# Patient Record
Sex: FEMALE | Race: WHITE | Employment: FULL TIME | URBAN - METROPOLITAN AREA
[De-identification: names, ages, dates, MRNs, and addresses within clinical notes are randomized per-mention and may not be internally consistent; named-entity substitution may affect disease eponyms.]

---

## 2017-01-18 ENCOUNTER — OFFICE VISIT (OUTPATIENT)
Dept: FAMILY MEDICINE | Facility: CLINIC | Age: 27
End: 2017-01-18
Payer: COMMERCIAL

## 2017-01-18 VITALS
DIASTOLIC BLOOD PRESSURE: 70 MMHG | WEIGHT: 119.6 LBS | TEMPERATURE: 98.8 F | HEART RATE: 75 BPM | SYSTOLIC BLOOD PRESSURE: 128 MMHG | BODY MASS INDEX: 20.42 KG/M2 | RESPIRATION RATE: 16 BRPM | OXYGEN SATURATION: 99 % | HEIGHT: 64 IN

## 2017-01-18 DIAGNOSIS — Z12.4 SCREENING FOR MALIGNANT NEOPLASM OF CERVIX: ICD-10-CM

## 2017-01-18 DIAGNOSIS — Z30.017 INSERTION OF NEXPLANON: Primary | ICD-10-CM

## 2017-01-18 DIAGNOSIS — F41.1 GAD (GENERALIZED ANXIETY DISORDER): ICD-10-CM

## 2017-01-18 DIAGNOSIS — Z11.3 SCREEN FOR STD (SEXUALLY TRANSMITTED DISEASE): ICD-10-CM

## 2017-01-18 LAB — BETA HCG QUAL IFA URINE: NEGATIVE

## 2017-01-18 PROCEDURE — 84703 CHORIONIC GONADOTROPIN ASSAY: CPT | Performed by: NURSE PRACTITIONER

## 2017-01-18 PROCEDURE — 99213 OFFICE O/P EST LOW 20 MIN: CPT | Mod: 25 | Performed by: NURSE PRACTITIONER

## 2017-01-18 PROCEDURE — 11981 INSERTION DRUG DLVR IMPLANT: CPT | Performed by: NURSE PRACTITIONER

## 2017-01-18 PROCEDURE — G0145 SCR C/V CYTO,THINLAYER,RESCR: HCPCS | Performed by: NURSE PRACTITIONER

## 2017-01-18 PROCEDURE — 87491 CHLMYD TRACH DNA AMP PROBE: CPT | Performed by: NURSE PRACTITIONER

## 2017-01-18 PROCEDURE — 87591 N.GONORRHOEAE DNA AMP PROB: CPT | Performed by: NURSE PRACTITIONER

## 2017-01-18 RX ORDER — FLUOXETINE 10 MG/1
10 CAPSULE ORAL DAILY
Qty: 90 CAPSULE | Refills: 1 | Status: SHIPPED | OUTPATIENT
Start: 2017-01-18 | End: 2018-11-29

## 2017-01-18 RX ORDER — FLUOXETINE 10 MG/1
10 CAPSULE ORAL DAILY
Qty: 30 CAPSULE | Refills: 1 | Status: SHIPPED | OUTPATIENT
Start: 2017-01-18 | End: 2017-01-18

## 2017-01-18 NOTE — PATIENT INSTRUCTIONS
Watch for any signs of infection.  Redness, swelling, increased pain, fever.  Follow up if these occur.  Keep the outer larger bandage on for 1 day, the band aid on for 3-5 days.  Call with concerns.

## 2017-01-18 NOTE — MR AVS SNAPSHOT
After Visit Summary   1/18/2017    Teri Bauman    MRN: [id number]           Patient Information     Date Of Birth          1990        Visit Information        Provider Department      1/18/2017 1:40 PM Desiree Mak Ra, APRN CNP [facility]        Today's Diagnoses     Insertion of Nexplanon    -  1     SARA (generalized anxiety disorder)         Screen for STD (sexually transmitted disease)         Screening for malignant neoplasm of cervix           Care Instructions    Watch for any signs of infection.  Redness, swelling, increased pain, fever.  Follow up if these occur.  Keep the outer larger bandage on for 1 day, the band aid on for 3-5 days.  Call with concerns.        Follow-ups after your visit        Who to contact     If you have questions or need follow up information about today's clinic visit or your schedule please contact [facility] directly at [phone].  Normal or non-critical lab and imaging results will be communicated to you by MyChart, letter or phone within 4 business days after the clinic has received the results. If you do not hear from us within 7 days, please contact the clinic through Clow[employer] or phone. If you have a critical or abnormal lab result, we will notify you by phone as soon as possible.  Submit refill requests through [employer] or call your pharmacy and they will forward the refill request to us. Please allow 3 business days for your refill to be completed.          Additional Information About Your Visit        MyChart Information     [employer] gives you secure access to your electronic health record. If you see a primary care provider, you can also send messages to your care team and make appointments. If you have questions, please call your primary care clinic.  If you do not have a primary care provider, please call [phone] and they will assist you.        Care EveryWhere ID     This is your Care EveryWhere ID.  "This could be used by other organizations to access your [facility] medical records  [id number]        Your Vitals Were     Pulse Temperature Respirations Height BMI (Body Mass Index) Pulse Oximetry    75 98.8  F (37.1  C) (Oral) 16 5' 4\" (1.626 m) 20.52 kg/m2 99%       Blood Pressure from Last 3 Encounters:   01/18/17 128/70   10/24/16 112/54   05/28/15 110/70    Weight from Last 3 Encounters:   01/18/17 119 lb 9.6 oz (54.25 kg)   10/24/16 116 lb 8 oz (52.844 kg)   05/28/15 114 lb 4.8 oz (51.846 kg)              We Performed the Following     Beta HCG qual IFA urine     Chlamydia trachomatis PCR     Neisseria gonorrhoeae PCR     Pap imaged thin layer screen reflex to HPV if ASCUS - recommend age 25 - 29          Where to get your medicines      These medications were sent to [id number] [address] [address]     Phone:  [phone]    - FLUoxetine 10 MG capsule       Primary Care Provider Office Phone # Fax #    Desiree GUY [facility] [phone]       [facility] [address] [address]  [facility] [address]        Thank you!     Thank you for choosing [facility]  for your care. Our goal is always to provide you with excellent care. Hearing back from our patients is one way we can continue to improve our services. Please take a few minutes to complete the written survey that you may receive in the mail after your visit with us. Thank you!             Your Updated Medication List - Protect others around you: Learn how to safely use, store and throw away your medicines at www.disposemymeds.org.          This list is accurate as of: 1/18/17  2:26 PM.  Always use your most recent med list.                   Brand Name Dispense Instructions for use    FLUoxetine 10 MG capsule    PROzac    30 capsule    Take 1 capsule (10 mg) by mouth daily       norethindrone-ethinyl estradiol 1-20 MG-MCG per tablet    MICROGESTIN " 1/20    3 Package    Take 1 tablet by mouth daily

## 2017-01-18 NOTE — PROGRESS NOTES
"  SUBJECTIVE:                                                    Teri Bauman is a 26 year old female who presents to clinic today for the following health issues:    Pt is here for a nexplanon insert and pap smear.      Pt returns for insertion of nexplanon.  Needs pap as well.    No changes since last seen.  No htn, blood clots, migraines, smoking.  Sexually active, male partner.  Monogamous.  Has been using condoms.  Just finished period.  No liver or kidney issues.    Problem list and histories reviewed & adjusted, as indicated.  Additional history: as documented    Patient Active Problem List   Diagnosis     CARDIOVASCULAR SCREENING; LDL GOAL LESS THAN 160     SARA (generalized anxiety disorder)     Papanicolaou smear of cervix with low grade squamous intraepithelial lesion (LGSIL) (aka LSIL)     History reviewed. No pertinent past surgical history.    Social History   Substance Use Topics     Smoking status: Never Smoker      Smokeless tobacco: Never Used     Alcohol Use: 0.0 oz/week     0 Standard drinks or equivalent per week      Comment: Occ     Family History   Problem Relation Age of Onset     Multiple Sclerosis Maternal Grandmother      Family History Negative Mother      Family History Negative Father      Family History Negative Maternal Grandfather      Family History Negative Paternal Grandfather      Family History Negative Paternal Grandmother          Problem list, Medication list, Allergies, and Medical/Social/Surgical histories reviewed in EPIC and updated as appropriate.    ROS:  SEE HPI.    OBJECTIVE:                                                    /70 mmHg  Pulse 75  Temp(Src) 98.8  F (37.1  C) (Oral)  Resp 16  Ht 5' 4\" (1.626 m)  Wt 119 lb 9.6 oz (54.25 kg)  BMI 20.52 kg/m2  SpO2 99%  Body mass index is 20.52 kg/(m^2).  GENERAL: healthy, alert and no distress   (female): normal female external genitalia, normal urethral meatus , vaginal mucosa pink, moist, well rugated " and normal cervix, adnexae, and uterus without masses.  PSYCH: mentation appears normal, affect normal/bright    Diagnostic Test Results:  Results for orders placed or performed in visit on 01/18/17 (from the past 24 hour(s))   Beta HCG qual IFA urine   Result Value Ref Range    Beta HCG Qual IFA Urine Negative NEG        ASSESSMENT/PLAN:                                                    1. Insertion of Nexplanon  Tolerated well, see below.  - Beta HCG qual IFA urine    2. SARA (generalized anxiety disorder)  Initially took for 1 month, thought it helped but did not know how to refill.  No SEs.  Would like to restart.  Refilled.    3. Screen for STD (sexually transmitted disease)  - Chlamydia trachomatis PCR  - Neisseria gonorrhoeae PCR    4. Screening for malignant neoplasm of cervix  Pap today.  - Pap imaged thin layer screen reflex to HPV if ASCUS - recommend age 25 - 29    NEXPLANON PLACEMENT:    The patient meets and is agreeable to the following conditions:  She is not interested in conception in the near future.  There is no previous history of pelvic inflammatory disease.  There is no previous history of ectopic pregnancy.  There is no history of unresolved abnormal uterine bleeding.  There is no history of an unresolved abnormal PAP smear.  She has no history of diabetes, AIDS, leukemia, IV drug use or chronic steroid use.  She denies the possibility of pregnancy. Patient's last menstrual period was 1/11/17.  Pregnancy test today is negative.    The patient was given patient information on the Nexplanon and the patient education brochure from the . The patient has given consent to proceed with placement of the Nexplanon.  A written consent form is present in the chart.  She wishes to proceed.    PROCEDURE:  Type of Device: Nexplanon  The patient lied in supine with the full length of her arm exposed and supported by the pillow and table.   The positioning the upper inner aspect of her  nondominant arm was bent at her elbow to 90 degrees and rotated upward and outward opposite her head.  The insertion site was Identified approximately four finger  breadths/ 8cm superior and lateral to the medial epicondyle of the humerus.  Skin was ana the skin to help guide insertion. One ana is made where the rylie will be inserted and a second ana is made four centimeters proximal to the first ana to serve as a direction guide during insertion.  The insertion site was cleansed with an antiseptic solution.  Anesthesia using Lidocaine 1% was injected into the dermis to raise a wheal along the planned track of the rylie insertion needle.   The clear plastic needle cover was removed. The needle was placed against the insertion site holding the applicator at an angle 30 degrees to the skin. While applying counter traction to the skin around the insertion site, the skin was punctured with the needle tip. The applicator was the lowered so that it is parallel to the skin and then the needle was advanced in the subdermal connective tissue while lifting the skin with the tip of the needle. The needle was advanced to its full length under the skin. I unlocked the slider with downward finger pressure on the lever, then moved the slider fully backward (distally) and removed the applicator.  Immediately after insertion, I palpated the skin to verify correct placement of the rylie; both ends were palpable. Patient was also asked to feel her implant.  An adhesive closure was placed on the insertion puncture and the site was wrapped with a pressure bandage.   Patient's User Card was completed and given to patient.     The patient tolerated this procedure without immediate complication.  The patient is to return or call immediately for any unexplained fever, redness, pain and swelling at the insertion site. Recommended to take Tylenol or NSAID for mild to moderate pain.    Patient was instructed may remove the pressure bandage in  24 hours and the small bandage over the insertion site after 3-5 days.   A completed the User Card was given to the patient to keep.     Information on Nexplanon was given to patient. She was instructed to watch for signs of infection such as redness, swelling, discharge, watch for increased bleeding, worsening pain and fever and to RTC ASAP. Questions and concerns were addressed.    GUY Agudelo Ra, CNP  [facility]

## 2017-01-18 NOTE — NURSING NOTE
"Chief Complaint   Patient presents with     Procedure     Nexplanon insert      Gyn Exam     pap smear        Initial /70 mmHg  Pulse 75  Temp(Src) 98.8  F (37.1  C) (Oral)  Resp 16  Ht 5' 4\" (1.626 m)  Wt 119 lb 9.6 oz (54.25 kg)  BMI 20.52 kg/m2  SpO2 99% Estimated body mass index is 20.52 kg/(m^2) as calculated from the following:    Height as of this encounter: 5' 4\" (1.626 m).    Weight as of this encounter: 119 lb 9.6 oz (54.25 kg).  BP completed using cuff size: bennett Caba MA      "

## 2017-01-19 LAB
C TRACH DNA SPEC QL NAA+PROBE: NORMAL
N GONORRHOEA DNA SPEC QL NAA+PROBE: NORMAL
SPECIMEN SOURCE: NORMAL
SPECIMEN SOURCE: NORMAL

## 2017-01-20 LAB
COPATH REPORT: NORMAL
PAP: NORMAL

## 2018-02-24 ENCOUNTER — HEALTH MAINTENANCE LETTER (OUTPATIENT)
Age: 28
End: 2018-02-24

## 2018-09-28 ENCOUNTER — TELEPHONE (OUTPATIENT)
Dept: FAMILY MEDICINE | Facility: CLINIC | Age: 28
End: 2018-09-28

## 2018-10-02 NOTE — TELEPHONE ENCOUNTER
Pt is past due for physical, f/u pap smear/HPV test.  1 reminder letter sent previously.  Left msg today for patient to call clinic to schedule.    If no reply and/or appt within two weeks (10/16/18) patient will be considered lost to pap tracking f/u.    BERE HajiN, RN, Pap Tracking Nurse

## 2018-11-29 ENCOUNTER — OFFICE VISIT (OUTPATIENT)
Dept: FAMILY MEDICINE | Facility: CLINIC | Age: 28
End: 2018-11-29
Payer: COMMERCIAL

## 2018-11-29 VITALS
OXYGEN SATURATION: 100 % | SYSTOLIC BLOOD PRESSURE: 108 MMHG | BODY MASS INDEX: 21.92 KG/M2 | HEIGHT: 64 IN | WEIGHT: 128.4 LBS | HEART RATE: 64 BPM | TEMPERATURE: 98.7 F | RESPIRATION RATE: 14 BRPM | DIASTOLIC BLOOD PRESSURE: 66 MMHG

## 2018-11-29 DIAGNOSIS — Z23 NEED FOR VACCINATION: ICD-10-CM

## 2018-11-29 DIAGNOSIS — R87.612 PAPANICOLAOU SMEAR OF CERVIX WITH LOW GRADE SQUAMOUS INTRAEPITHELIAL LESION (LGSIL): Primary | ICD-10-CM

## 2018-11-29 DIAGNOSIS — H61.21 IMPACTED CERUMEN OF RIGHT EAR: ICD-10-CM

## 2018-11-29 DIAGNOSIS — N63.0 LUMP OR MASS IN BREAST: ICD-10-CM

## 2018-11-29 PROCEDURE — 88175 CYTOPATH C/V AUTO FLUID REDO: CPT | Performed by: NURSE PRACTITIONER

## 2018-11-29 PROCEDURE — 90471 IMMUNIZATION ADMIN: CPT | Performed by: NURSE PRACTITIONER

## 2018-11-29 PROCEDURE — 99213 OFFICE O/P EST LOW 20 MIN: CPT | Mod: 25 | Performed by: NURSE PRACTITIONER

## 2018-11-29 PROCEDURE — 69209 REMOVE IMPACTED EAR WAX UNI: CPT | Mod: RT | Performed by: NURSE PRACTITIONER

## 2018-11-29 PROCEDURE — 87624 HPV HI-RISK TYP POOLED RSLT: CPT | Performed by: NURSE PRACTITIONER

## 2018-11-29 PROCEDURE — 90715 TDAP VACCINE 7 YRS/> IM: CPT | Performed by: NURSE PRACTITIONER

## 2018-11-29 NOTE — PROGRESS NOTES
SUBJECTIVE:   Teri Bauman is a 28 year old female who presents to clinic today for the following health issues:      Ear problem      Duration: 2 weeks    Description (location/character/radiation): trouble hear out of right ear    Intensity:  mild    Accompanying signs and symptoms: muffled sound    History (similar episodes/previous evaluation): None    Precipitating or alleviating factors: None    Therapies tried and outcome: None   Pt was itching her ear and pushed wax in.  Now with muffled hearing.  No pain.    Patient would like to get a pap.  Previous LSIL pap.  Needs diagnostic.    She also notes a breast lump R breast, no pain.  She had a previous surgery for a cyst when she was in high school.    Problem list and histories reviewed & adjusted, as indicated.  Additional history: as documented    Patient Active Problem List   Diagnosis     CARDIOVASCULAR SCREENING; LDL GOAL LESS THAN 160     SARA (generalized anxiety disorder)     Papanicolaou smear of cervix with low grade squamous intraepithelial lesion (LGSIL) (aka LSIL)     History reviewed. No pertinent surgical history.    Social History   Substance Use Topics     Smoking status: Never Smoker     Smokeless tobacco: Never Used     Alcohol use 0.0 oz/week     0 Standard drinks or equivalent per week      Comment: Occ     Family History   Problem Relation Age of Onset     Multiple Sclerosis Maternal Grandmother      Family History Negative Mother      Family History Negative Father      Family History Negative Maternal Grandfather      Family History Negative Paternal Grandfather      Family History Negative Paternal Grandmother            Reviewed and updated as needed this visit by clinical staff       Reviewed and updated as needed this visit by Provider         ROS:  SEE HPI.    OBJECTIVE:     /66 (BP Location: Right arm, Patient Position: Chair, Cuff Size: Adult Regular)  Pulse 64  Temp 98.7  F (37.1  C) (Tympanic)  Resp 14  Ht 5'  "3.5\" (1.613 m)  Wt 128 lb 6.4 oz (58.2 kg)  SpO2 100%  BMI 22.39 kg/m2  Body mass index is 22.39 kg/(m^2).  GENERAL: healthy, alert and no distress  EYES: Eyes grossly normal to inspection  HENT: normal cephalic/atraumatic, right ear: occluded with wax and left ear: normal: no effusions, no erythema, normal landmarks  BREAST: R breast 9 o'clock position palpable nontender lump.   (female): normal female external genitalia, normal urethral meatus , vaginal mucosa pink, moist, well rugated and normal cervix  PSYCH: mentation appears normal, affect normal/bright    Diagnostic Test Results:  none     ASSESSMENT/PLAN:   1. Papanicolaou smear of cervix with low grade squamous intraepithelial lesion (LGSIL) (aka LSIL)  Pap today.  - Pap imaged thin layer diagnostic with HPV (select HPV order below)    2. Lump or mass in breast  US ordered.  - US Breast Right Complete 4 Quadrants; Future    3. Impacted cerumen of right ear  Irrigated successfully in clinic.    4. Need for vaccination  - SCREENING QUESTIONS FOR ADULT IMMUNIZATIONS  - TDAP, IM (10 - 64 YRS) - Adacel  - ADMIN 1st VACCINE    GUY Agudelo Ra, CNP  [facility]  "

## 2018-11-29 NOTE — MR AVS SNAPSHOT
After Visit Summary   11/29/2018    Teri Bauman    MRN: [id number]           Patient Information     Date Of Birth          1990        Visit Information        Provider Department      11/29/2018 3:40 PM Desiree Mak Ra, APRN CNP [facility]        Today's Diagnoses     Need for vaccination    -  1    Papanicolaou smear of cervix with low grade squamous intraepithelial lesion (LGSIL) (aka LSIL)        Lump or mass in breast        Impacted cerumen of right ear          Care Instructions    We will talk more after the ultrasound is done.          Follow-ups after your visit        Follow-up notes from your care team     Return in about 1 year (around 11/29/2019) for Physical Exam.      Future tests that were ordered for you today     Open Future Orders        Priority Expected Expires Ordered    US Breast Right Complete 4 Quadrants Routine  11/29/2019 11/29/2018            Who to contact     If you have questions or need follow up information about today's clinic visit or your schedule please contact [facility] directly at [phone].  Normal or non-critical lab and imaging results will be communicated to you by [employer], letter or phone within 4 business days after the clinic has received the results. If you do not hear from us within 7 days, please contact the clinic through [employer] or phone. If you have a critical or abnormal lab result, we will notify you by phone as soon as possible.  Submit refill requests through [employer] or call your pharmacy and they will forward the refill request to us. Please allow 3 business days for your refill to be completed.          Additional Information About Your Visit        [employer] Information     [employer] gives you secure access to your electronic health record. If you see a primary care provider, you can also send messages to your care team and make appointments. If you have questions, please call your primary care  "clinic.  If you do not have a primary care provider, please call [phone] and they will assist you.        Care EveryWhere ID     This is your Care EveryWhere ID. This could be used by other organizations to access your [facility] medical records  [id number]        Your Vitals Were     Pulse Temperature Respirations Height Pulse Oximetry BMI (Body Mass Index)    64 98.7  F (37.1  C) (Tympanic) 14 5' 3.5\" (1.613 m) 100% 22.39 kg/m2       Blood Pressure from Last 3 Encounters:   11/29/18 108/66   01/18/17 128/70   10/24/16 112/54    Weight from Last 3 Encounters:   11/29/18 128 lb 6.4 oz (58.2 kg)   01/18/17 119 lb 9.6 oz (54.3 kg)   10/24/16 116 lb 8 oz (52.8 kg)              We Performed the Following     ADMIN 1st VACCINE     Pap imaged thin layer diagnostic with HPV (select HPV order below)     SCREENING QUESTIONS FOR ADULT IMMUNIZATIONS     TDAP, IM (10 - 64 YRS) - Adacel        Primary Care Provider Office Phone # Fax #    Desiree GUY [facility] [phone]       [address] [facility] [address]        Equal Access to Services     BALJINDER SANCHEZ AH: Hadii aad ku hadasho Soomaali, waaxda luqadaha, qaybta kaalmada adeegyada, waxay idiin haymansin nash villa . So [facility] [phone].    ATENCIÓN: Si habla español, tiene a alanis disposición servicios gratuitos de asistencia lingüística. Lljoel al [phone].    We comply with applicable federal civil rights laws and [place] laws. We do not discriminate on the basis of race, color, national origin, age, disability, sex, sexual orientation, or gender identity.            Thank you!     Thank you for choosing [facility]  for your care. Our goal is always to provide you with excellent care. Hearing back from our patients is one way we can continue to improve our services. Please take a few minutes to complete the written survey that you may receive in the mail after your visit with us. Thank you!             Your Updated " Medication List - Protect others around you: Learn how to safely use, store and throw away your medicines at www.disposemymeds.org.          This list is accurate as of 11/29/18  4:21 PM.  Always use your most recent med list.                   Brand Name Dispense Instructions for use Diagnosis    FLUoxetine 10 MG capsule    PROzac    90 capsule    Take 1 capsule (10 mg) by mouth daily    SARA (generalized anxiety disorder)       NEXPLANON 68 MG Impl   Generic drug:  etonogestrel      1 each by Subdermal route once        norethindrone-ethinyl estradiol 1-20 MG-MCG tablet    MICROGESTIN 1/20    3 Package    Take 1 tablet by mouth daily    Routine general medical examination at a health care facility, Contraception

## 2018-12-04 LAB
COPATH REPORT: NORMAL
PAP: NORMAL

## 2018-12-05 LAB
FINAL DIAGNOSIS: NORMAL
HPV HR 12 DNA CVX QL NAA+PROBE: NEGATIVE
HPV16 DNA SPEC QL NAA+PROBE: NEGATIVE
HPV18 DNA SPEC QL NAA+PROBE: NEGATIVE
SPECIMEN DESCRIPTION: NORMAL
SPECIMEN SOURCE CVX/VAG CYTO: NORMAL

## 2018-12-21 ENCOUNTER — HOSPITAL ENCOUNTER (OUTPATIENT)
Dept: ULTRASOUND IMAGING | Facility: CLINIC | Age: 28
Discharge: HOME | End: 2018-12-21
Attending: NURSE PRACTITIONER | Admitting: NURSE PRACTITIONER
Payer: COMMERCIAL

## 2018-12-21 DIAGNOSIS — N63.0 LUMP OR MASS IN BREAST: ICD-10-CM

## 2018-12-21 PROCEDURE — 76642 ULTRASOUND BREAST LIMITED: CPT | Mod: RT

## 2020-02-10 ENCOUNTER — HEALTH MAINTENANCE LETTER (OUTPATIENT)
Age: 30
End: 2020-02-10

## 2020-11-16 ENCOUNTER — HEALTH MAINTENANCE LETTER (OUTPATIENT)
Age: 30
End: 2020-11-16

## 2021-01-27 ENCOUNTER — OFFICE VISIT (OUTPATIENT)
Dept: FAMILY MEDICINE | Facility: CLINIC | Age: 31
End: 2021-01-27
Payer: COMMERCIAL

## 2021-01-27 VITALS
DIASTOLIC BLOOD PRESSURE: 64 MMHG | BODY MASS INDEX: 21.68 KG/M2 | HEIGHT: 64 IN | HEART RATE: 56 BPM | RESPIRATION RATE: 16 BRPM | WEIGHT: 127 LBS | SYSTOLIC BLOOD PRESSURE: 108 MMHG | TEMPERATURE: 98.6 F

## 2021-01-27 DIAGNOSIS — Z30.46 NEXPLANON REMOVAL: Primary | ICD-10-CM

## 2021-01-27 DIAGNOSIS — Z30.011 ENCOUNTER FOR INITIAL PRESCRIPTION OF CONTRACEPTIVE PILLS: ICD-10-CM

## 2021-01-27 PROCEDURE — 99213 OFFICE O/P EST LOW 20 MIN: CPT | Mod: 25 | Performed by: NURSE PRACTITIONER

## 2021-01-27 PROCEDURE — 11982 REMOVE DRUG IMPLANT DEVICE: CPT | Performed by: NURSE PRACTITIONER

## 2021-01-27 RX ORDER — NORETHINDRONE ACETATE AND ETHINYL ESTRADIOL .02; 1 MG/1; MG/1
1 TABLET ORAL DAILY
Qty: 84 TABLET | Refills: 3 | Status: SHIPPED | OUTPATIENT
Start: 2021-01-27 | End: 2021-10-21

## 2021-01-27 ASSESSMENT — MIFFLIN-ST. JEOR: SCORE: 1286.07

## 2021-01-27 NOTE — PROGRESS NOTES
"  Assessment & Plan     Nexplanon removal  Tolerated well.  See below.  - REMOVAL IMPLATABLE CONTRACEPTIVE CAPSULE    Encounter for initial prescription of contraceptive pills  No known contraindications.    - norethindrone-ethinyl estradiol (MICROGESTIN 1/20) 1-20 MG-MCG tablet; Take 1 tablet by mouth daily      Return in about 10 months (around 11/27/2021) for Physical Exam.    Desiree Mak, GUY CNP  [facility] Williamson is a 30 year old who presents to clinic today for the following health issues     HPI       Patient is here to have her Nexplanon removed and discuss other birth control options.  Has done well with this.  She is interested in taking the pill.  Works regular hours now and believes compliance will not be an issue.  No hx of blood clots, smoking, migraines, htn.  She would like something that could possibly help with some excess hair growth she has.  She has had some irregular bleeding over the past year with nexplanon in place.    Review of Systems   Constitutional, HEENT, cardiovascular, pulmonary, gi and gu systems are negative, except as otherwise noted.      Objective    /64 (BP Location: Right arm, Patient Position: Sitting, Cuff Size: Adult Regular)   Pulse 56   Temp 98.6  F (37  C) (Oral)   Resp 16   Ht 1.626 m (5' 4\")   Wt 57.6 kg (127 lb)   BMI 21.80 kg/m    Body mass index is 21.8 kg/m .  Physical Exam   GENERAL: healthy, alert and no distress  PSYCH: mentation appears normal, affect normal/bright          "

## 2021-04-03 ENCOUNTER — HEALTH MAINTENANCE LETTER (OUTPATIENT)
Age: 31
End: 2021-04-03

## 2021-09-12 ENCOUNTER — HEALTH MAINTENANCE LETTER (OUTPATIENT)
Age: 31
End: 2021-09-12

## 2021-10-21 ENCOUNTER — OFFICE VISIT (OUTPATIENT)
Dept: FAMILY MEDICINE | Facility: CLINIC | Age: 31
End: 2021-10-21
Payer: COMMERCIAL

## 2021-10-21 VITALS
OXYGEN SATURATION: 100 % | WEIGHT: 129.1 LBS | TEMPERATURE: 97.8 F | RESPIRATION RATE: 16 BRPM | DIASTOLIC BLOOD PRESSURE: 60 MMHG | HEART RATE: 54 BPM | SYSTOLIC BLOOD PRESSURE: 104 MMHG | BODY MASS INDEX: 22.16 KG/M2

## 2021-10-21 DIAGNOSIS — Z13.6 CARDIOVASCULAR SCREENING; LDL GOAL LESS THAN 160: Primary | ICD-10-CM

## 2021-10-21 DIAGNOSIS — F41.1 GAD (GENERALIZED ANXIETY DISORDER): ICD-10-CM

## 2021-10-21 DIAGNOSIS — Z00.00 ROUTINE GENERAL MEDICAL EXAMINATION AT A HEALTH CARE FACILITY: ICD-10-CM

## 2021-10-21 DIAGNOSIS — Z12.4 SCREENING FOR MALIGNANT NEOPLASM OF CERVIX: ICD-10-CM

## 2021-10-21 DIAGNOSIS — Z30.011 ENCOUNTER FOR INITIAL PRESCRIPTION OF CONTRACEPTIVE PILLS: ICD-10-CM

## 2021-10-21 PROCEDURE — 99213 OFFICE O/P EST LOW 20 MIN: CPT | Mod: 25 | Performed by: NURSE PRACTITIONER

## 2021-10-21 PROCEDURE — G0145 SCR C/V CYTO,THINLAYER,RESCR: HCPCS | Performed by: NURSE PRACTITIONER

## 2021-10-21 PROCEDURE — 99395 PREV VISIT EST AGE 18-39: CPT | Performed by: NURSE PRACTITIONER

## 2021-10-21 PROCEDURE — 87624 HPV HI-RISK TYP POOLED RSLT: CPT | Performed by: NURSE PRACTITIONER

## 2021-10-21 RX ORDER — CITALOPRAM HYDROBROMIDE 20 MG/1
20 TABLET ORAL DAILY
Qty: 90 TABLET | Refills: 0 | Status: SHIPPED | OUTPATIENT
Start: 2021-10-21 | End: 2021-12-06

## 2021-10-21 RX ORDER — NORETHINDRONE ACETATE AND ETHINYL ESTRADIOL .02; 1 MG/1; MG/1
1 TABLET ORAL DAILY
Qty: 84 TABLET | Refills: 3 | Status: SHIPPED | OUTPATIENT
Start: 2021-10-21 | End: 2023-05-19

## 2021-10-21 ASSESSMENT — ENCOUNTER SYMPTOMS
ARTHRALGIAS: 0
COUGH: 0
DIARRHEA: 0
CHILLS: 0
BREAST MASS: 0
DYSURIA: 0
WEAKNESS: 0
PARESTHESIAS: 0
HEMATOCHEZIA: 0
PALPITATIONS: 0
EYE PAIN: 0
CONSTIPATION: 0
NERVOUS/ANXIOUS: 1
HEMATURIA: 0
DIZZINESS: 0
ABDOMINAL PAIN: 0
SHORTNESS OF BREATH: 0
HEADACHES: 1
JOINT SWELLING: 0
MYALGIAS: 1
HEARTBURN: 0
NAUSEA: 0
FREQUENCY: 0
SORE THROAT: 0

## 2021-10-21 ASSESSMENT — PAIN SCALES - GENERAL: PAINLEVEL: MILD PAIN (2)

## 2021-10-21 NOTE — PROGRESS NOTES
SUBJECTIVE:   CC: Teri Bauman is an 31 year old woman who presents for preventive health visit.       Patient has been advised of split billing requirements and indicates understanding: Yes  Healthy Habits:     Getting at least 3 servings of Calcium per day:  Yes    Bi-annual eye exam:  Yes    Dental care twice a year:  NO    Sleep apnea or symptoms of sleep apnea:  None    Diet:  Regular (no restrictions)    Frequency of exercise:  1 day/week    Duration of exercise:  Less than 15 minutes    Taking medications regularly:  Yes    Medication side effects:  None    PHQ-2 Total Score: 2    Additional concerns today:  No    Interested in something for anxiety.  Previously took prozac for about 1 month 6 years ago.  GI distress noted so did not continue.  Family member has had success with citalopram.      Refill ocp  No hx of blood clots, smoking, migraines, htn.      Today's PHQ-2 Score:   PHQ-2 ( 1999 [employer]) 10/21/2021   Q1: Little interest or pleasure in doing things 2   Q2: Feeling down, depressed or hopeless 0   PHQ-2 Score 2   Q1: Little interest or pleasure in doing things More than half the days   Q2: Feeling down, depressed or hopeless Not at all   PHQ-2 Score 2       Abuse: Current or Past (Physical, Sexual or Emotional) - No  Do you feel safe in your environment? Yes    Have you ever done Advance Care Planning? (For example, a Health Directive, POLST, or a discussion with a medical provider or your loved ones about your wishes): No, advance care planning information given to patient to review.  Patient declined advance care planning discussion at this time.    Social History     Tobacco Use     Smoking status: Never Smoker     Smokeless tobacco: Never Used   Substance Use Topics     Alcohol use: Yes     Alcohol/week: 0.0 standard drinks     Comment: Occ         Alcohol Use 10/21/2021   Prescreen: >3 drinks/day or >7 drinks/week? No   Prescreen: >3 drinks/day or >7 drinks/week? -       Reviewed  orders with patient.  Reviewed health maintenance and updated orders accordingly - Yes  Patient Active Problem List   Diagnosis     CARDIOVASCULAR SCREENING; LDL GOAL LESS THAN 160     SARA (generalized anxiety disorder)     Papanicolaou smear of cervix with low grade squamous intraepithelial lesion (LGSIL) (aka LSIL)     History reviewed. No pertinent surgical history.    Social History     Tobacco Use     Smoking status: Never Smoker     Smokeless tobacco: Never Used   Substance Use Topics     Alcohol use: Yes     Alcohol/week: 0.0 standard drinks     Comment: Occ     Family History   Problem Relation Age of Onset     Multiple Sclerosis Maternal Grandmother      Family History Negative Mother      Family History Negative Father      Family History Negative Maternal Grandfather      Family History Negative Paternal Grandfather      Family History Negative Paternal Grandmother                FHS-7: No flowsheet data found.    Patient under 40 years of age: Routine Mammogram Screening not recommended.   Pertinent mammograms are reviewed under the imaging tab.    History of abnormal Pap smear: YES - updated in Problem List and Health Maintenance accordingly  PAP / HPV Latest Ref Rng & Units 11/29/2018 1/18/2017 5/28/2015   PAP (Historical) - NIL NIL LSIL(A)   HPV16 NEG:Negative Negative - -   HPV18 NEG:Negative Negative - -   HRHPV NEG:Negative Negative - -     Reviewed and updated as needed this visit by clinical staff  Tobacco  Allergies  Meds   Med Hx  Surg Hx  Fam Hx  Soc Hx        Reviewed and updated as needed this visit by Provider                    Review of Systems   Constitutional: Negative for chills.   HENT: Positive for ear pain. Negative for congestion, hearing loss and sore throat.    Eyes: Negative for pain and visual disturbance.   Respiratory: Negative for cough and shortness of breath.    Cardiovascular: Negative for chest pain, palpitations and peripheral edema.   Gastrointestinal: Negative  for abdominal pain, constipation, diarrhea, heartburn, hematochezia and nausea.   Breasts:  Negative for tenderness, breast mass and discharge.   Genitourinary: Negative for dysuria, frequency, genital sores, hematuria, pelvic pain, urgency, vaginal bleeding and vaginal discharge.   Musculoskeletal: Positive for myalgias. Negative for arthralgias and joint swelling.   Skin: Negative for rash.   Neurological: Positive for headaches. Negative for dizziness, weakness and paresthesias.   Psychiatric/Behavioral: Negative for mood changes. The patient is nervous/anxious.           OBJECTIVE:   /60 (BP Location: Right arm, Patient Position: Sitting, Cuff Size: Adult Regular)   Pulse 54   Temp 97.8  F (36.6  C) (Oral)   Resp 16   Wt 58.6 kg (129 lb 1.6 oz)   LMP 10/17/2021 (Approximate)   SpO2 100%   Breastfeeding No   BMI 22.16 kg/m    Physical Exam  GENERAL: healthy, alert and no distress  EYES: Eyes grossly normal to inspection  HENT: ear canals and TM's normal, nose and mouth without ulcers or lesions  NECK: no adenopathy, no asymmetry, masses, or scars and thyroid normal to palpation  RESP: lungs clear to auscultation - no rales, rhonchi or wheezes  BREAST: normal without masses, tenderness or nipple discharge and no palpable axillary masses or adenopathy  CV: regular rate and rhythm, normal S1 S2, no S3 or S4, no murmur, click or rub, no peripheral edema and peripheral pulses strong  ABDOMEN: soft, nontender, no hepatosplenomegaly, no masses and bowel sounds normal   (female): normal female external genitalia, normal urethral meatus, vaginal mucosa pink, moist, well rugated, and normal cervix  NEURO: Normal strength and tone, mentation intact and speech normal  PSYCH: mentation appears normal, affect normal/bright    Diagnostic Test Results:  Labs reviewed in Epic    ASSESSMENT/PLAN:   (Z13.6) CARDIOVASCULAR SCREENING; LDL GOAL LESS THAN 160  (primary encounter diagnosis)  Plan: Lipid panel reflex to  "direct LDL Fasting            (Z00.00) Routine general medical examination at a health care facility  Plan: Basic metabolic panel  (Ca, Cl, CO2, Creat,         Gluc, K, Na, BUN)        Will complete biometric form, fax, and mail original to pt's home.    (Z12.4) Screening for malignant neoplasm of cervix    Plan: Pap Screen with HPV - recommended age 30 - 65         years            (Z30.011) Encounter for initial prescription of contraceptive pills  Plan: norethindrone-ethinyl estradiol (MICROGESTIN         1/20) 1-20 MG-MCG tablet        No known contraindications, refilled.    Anxiety  Discussed options.  Trial citalopram.  Reviewed r/b/se.          Patient has been advised of split billing requirements and indicates understanding: Yes  COUNSELING:  Reviewed preventive health counseling, as reflected in patient instructions    Estimated body mass index is 22.16 kg/m  as calculated from the following:    Height as of 1/27/21: 1.626 m (5' 4\").    Weight as of this encounter: 58.6 kg (129 lb 1.6 oz).        She reports that she has never smoked. She has never used smokeless tobacco.      Counseling Resources:  ATP IV Guidelines  Pooled Cohorts Equation Calculator  Breast Cancer Risk Calculator  BRCA-Related Cancer Risk Assessment: FHS-7 Tool  FRAX Risk Assessment  ICSI Preventive Guidelines  Dietary Guidelines for Americans, 2010  USDA's MyPlate  ASA Prophylaxis  Lung CA Screening    GUY Agudelo Ra, CNP  [facility] [place]  "

## 2021-10-26 LAB
BKR LAB AP GYN ADEQUACY: NORMAL
BKR LAB AP GYN INTERPRETATION: NORMAL
BKR LAB AP HPV REFLEX: NORMAL
BKR LAB AP PREVIOUS ABNORMAL: NORMAL
PATH REPORT.COMMENTS IMP SPEC: NORMAL
PATH REPORT.RELEVANT HX SPEC: NORMAL

## 2021-10-28 LAB
HUMAN PAPILLOMA VIRUS 16 DNA: NEGATIVE
HUMAN PAPILLOMA VIRUS 18 DNA: NEGATIVE
HUMAN PAPILLOMA VIRUS FINAL DIAGNOSIS: NORMAL
HUMAN PAPILLOMA VIRUS OTHER HR: NEGATIVE

## 2021-12-06 ENCOUNTER — VIRTUAL VISIT (OUTPATIENT)
Dept: FAMILY MEDICINE | Facility: CLINIC | Age: 31
End: 2021-12-06
Payer: COMMERCIAL

## 2021-12-06 DIAGNOSIS — F41.1 GAD (GENERALIZED ANXIETY DISORDER): ICD-10-CM

## 2021-12-06 PROCEDURE — 99213 OFFICE O/P EST LOW 20 MIN: CPT | Mod: 95 | Performed by: NURSE PRACTITIONER

## 2021-12-06 RX ORDER — CITALOPRAM HYDROBROMIDE 20 MG/1
20 TABLET ORAL DAILY
Qty: 90 TABLET | Refills: 3 | Status: SHIPPED | OUTPATIENT
Start: 2021-12-06 | End: 2023-05-19

## 2021-12-06 ASSESSMENT — ANXIETY QUESTIONNAIRES
6. BECOMING EASILY ANNOYED OR IRRITABLE: SEVERAL DAYS
GAD7 TOTAL SCORE: 4
2. NOT BEING ABLE TO STOP OR CONTROL WORRYING: NOT AT ALL
7. FEELING AFRAID AS IF SOMETHING AWFUL MIGHT HAPPEN: NOT AT ALL
3. WORRYING TOO MUCH ABOUT DIFFERENT THINGS: NOT AT ALL
IF YOU CHECKED OFF ANY PROBLEMS ON THIS QUESTIONNAIRE, HOW DIFFICULT HAVE THESE PROBLEMS MADE IT FOR YOU TO DO YOUR WORK, TAKE CARE OF THINGS AT HOME, OR GET ALONG WITH OTHER PEOPLE: SOMEWHAT DIFFICULT
5. BEING SO RESTLESS THAT IT IS HARD TO SIT STILL: SEVERAL DAYS
1. FEELING NERVOUS, ANXIOUS, OR ON EDGE: SEVERAL DAYS

## 2021-12-06 ASSESSMENT — PATIENT HEALTH QUESTIONNAIRE - PHQ9: 5. POOR APPETITE OR OVEREATING: SEVERAL DAYS

## 2021-12-06 NOTE — PROGRESS NOTES
Teri is a 31 year old who is being evaluated via a billable video visit.      How would you like to obtain your AVS? MyChart  If the video visit is dropped, the invitation should be resent by:   Will anyone else be joining your video visit? No    Video Start Time: 4:43 PM    Assessment & Plan     SARA (generalized anxiety disorder)  Improved, tolerating well.  Refilled.  - citalopram (CELEXA) 20 MG tablet; Take 1 tablet (20 mg) by mouth daily                 No follow-ups on file.    GUY Agudelo Ra, CNP  [facility]   Teri is a 31 year old who presents for the following health issues     HPI     Anxiety Follow-Up    How are you doing with your anxiety since your last visit? Improved     Are you having other symptoms that might be associated with anxiety? Yes:  OCD where it can be detrimental at work and home    Have you had a significant life event? Job Concerns     Are you feeling depressed? No    Do you have any concerns with your use of alcohol or other drugs? No    Social History     Tobacco Use     Smoking status: Never Smoker     Smokeless tobacco: Never Used   Substance Use Topics     Alcohol use: Yes     Alcohol/week: 0.0 standard drinks     Comment: Occ     Drug use: No     SARA-7 SCORE 5/28/2015 12/6/2021   Total Score 6 -   Total Score - 4     No flowsheet data found.        How many servings of fruits and vegetables do you eat daily?  2-3    On average, how many sweetened beverages do you drink each day (Examples: soda, juice, sweet tea, etc.  Do NOT count diet or artificially sweetened beverages)?   2    How many days per week do you exercise enough to make your heart beat faster? none    How many minutes a day do you exercise enough to make your heart beat faster? none  How many days per week do you miss taking your medication? 1    What makes it hard for you to take your medications?  remembering to take    Taking med as directed.  Positive  results.  Sleeping better.  Less anxious.  No change desired.    Review of Systems   Constitutional, HEENT, cardiovascular, pulmonary, gi and gu systems are negative, except as otherwise noted.      Objective    Vitals - Patient Reported  Pain Score: No Pain (0)      Vitals:  No vitals were obtained today due to virtual visit.    Physical Exam   GENERAL: Healthy, alert and no distress  EYES: Eyes grossly normal to inspection.  No discharge or erythema, or obvious scleral/conjunctival abnormalities.  RESP: No audible wheeze, cough, or visible cyanosis.  No visible retractions or increased work of breathing.    SKIN: Visible skin clear. No significant rash, abnormal pigmentation or lesions.  NEURO: Cranial nerves grossly intact.  Mentation and speech appropriate for age.  PSYCH: Mentation appears normal, affect normal/bright, judgement and insight intact, normal speech and appearance well-groomed.                Video-Visit Details    Type of service:  Video Visit    Video End Time:4:47 PM    Originating Location (pt. Location): Home    [facility]     Platform used for Video Visit: [employer]

## 2021-12-07 ASSESSMENT — ANXIETY QUESTIONNAIRES: GAD7 TOTAL SCORE: 4

## 2022-11-19 ENCOUNTER — HEALTH MAINTENANCE LETTER (OUTPATIENT)
Age: 32
End: 2022-11-19

## 2022-12-18 ENCOUNTER — HEALTH MAINTENANCE LETTER (OUTPATIENT)
Age: 32
End: 2022-12-18

## 2024-01-28 ENCOUNTER — HEALTH MAINTENANCE LETTER (OUTPATIENT)
Age: 34
End: 2024-01-28